# Patient Record
Sex: FEMALE | Race: BLACK OR AFRICAN AMERICAN | URBAN - METROPOLITAN AREA
[De-identification: names, ages, dates, MRNs, and addresses within clinical notes are randomized per-mention and may not be internally consistent; named-entity substitution may affect disease eponyms.]

---

## 2017-01-16 ENCOUNTER — OFFICE VISIT - RIVER FALLS (OUTPATIENT)
Dept: FAMILY MEDICINE | Facility: CLINIC | Age: 27
End: 2017-01-16

## 2017-06-16 ENCOUNTER — OFFICE VISIT - RIVER FALLS (OUTPATIENT)
Dept: FAMILY MEDICINE | Facility: CLINIC | Age: 27
End: 2017-06-16

## 2017-06-16 ASSESSMENT — MIFFLIN-ST. JEOR: SCORE: 1631.68

## 2020-06-19 DIAGNOSIS — Z11.59 SPECIAL SCREENING EXAMINATION FOR VIRAL DISEASE: Primary | ICD-10-CM

## 2022-02-12 VITALS
RESPIRATION RATE: 16 BRPM | HEIGHT: 67 IN | BODY MASS INDEX: 30.76 KG/M2 | DIASTOLIC BLOOD PRESSURE: 60 MMHG | SYSTOLIC BLOOD PRESSURE: 96 MMHG | OXYGEN SATURATION: 97 % | WEIGHT: 196 LBS | HEART RATE: 80 BPM | TEMPERATURE: 102.9 F

## 2022-02-12 VITALS — HEART RATE: 60 BPM | SYSTOLIC BLOOD PRESSURE: 108 MMHG | DIASTOLIC BLOOD PRESSURE: 68 MMHG | TEMPERATURE: 98.2 F

## 2022-02-16 NOTE — NURSING NOTE
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 She reports having chest discomfort mid -low sternum area for several days.She recently had a ECG that was normal. See chart note of recent visit.    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  She is also is drinking >60 oz of diet coke per day and eating a very unhealthy diet. I suggested replacing the coke with water and trying a OTC acid reducer such as Tums or Maalox.  If symptoms don't improve or resolve she should be evaluated ASAP. ED if pain becomes or discomfort increases.  Patient voiced understanding.    She will be making appointment for a well women visit with LILIANA soon to discuss other health issues.

## 2022-02-16 NOTE — PROGRESS NOTES
Patient:   RO PERSON            MRN: [id number]            FIN: [id number]               Age:   27 years     Sex:  Female     :  1990   Associated Diagnoses:   None   Author:   Garry Dunn MD      Visit Information      Date of Service: 2017 05:04 pm  Performing Location: [facility]  Encounter#: [id number]      Primary Care Provider (PCP):  [id number] -UNKNOWN,      Referring Provider:  No referring provider recorded for selected visit.      Chief Complaint   2017 5:19 PM CST    Numbness in hands and toes.  Left side of face gets puffy.  ONgoing for a couple days.        History of Present Illness   Pt has had 3 days of intermittent, lasting a few hours of feeling of tingling and warmth spreading up her nek into the left side of her face, also going down her left arm, feels like she has to shake her arm out, and in her elft tose. Also with some chest discomfort. Feels like her neck is tight in the back.  Course seems to be improving.    She is not on estrogen containing birth control. No significant / pertinent family hsitory    She has been taking hydroxycut for the last week for wt loss.      Review of Systems         Gen - no fever  Neuro - no weaknss, dysarthria, balance problems.  Psych - has anxiety      Health Status   Allergies:    Allergic Reactions (Selected)  No known allergies   Medications:  (Selected)   Documented Medications  Documented  Nexplanon: ( 68 mg ), subcutaneous, once, 0 Refill(s), Type: Maintenance      Histories   Family History:    HTN - Hypertension  Mother (Rochelle Person)     Social History:        Alcohol Assessment            Never      Tobacco Assessment            Never      Substance Abuse Assessment            Never      Employment and Education Assessment            Employed, Work/School description: .      Home and Environment Assessment            Marital status: Single.      Nutrition and Health Assessment             Type of diet: Regular.      Exercise and Physical Activity Assessment            Exercise frequency: 3-4 times/week.  Exercise type: Bicycling, Running.      Sexual Assessment            Sexually active: Yes.  Sexual orientation: Heterosexual.  Contraceptive Use Details: Nexplanon.        Physical Examination   Vital Signs   1/16/2017 5:19 PM CST Temperature Tympanic 98.2 DegF    Peripheral Pulse Rate 60 bpm    Systolic Blood Pressure 108 mmHg    Diastolic Blood Pressure 68 mmHg    Mean Arterial Pressure 81 mmHg      Measurements from flowsheet : Measurements   1/16/2017 5:19 PM CST    Ht/Wt Measurement Refused by Patient?     Yes     Gen - appears well  CV: RRR w/o MRG. Normal S1 and S2   Resp: Clear to auscultation b/l. Normal breath sounds without wheezes or crackles. No increased work of breathing.   Neuro - Alert & oriented. CN intact. Balance normal. Strength normal throughout. Reflexes normal.   Neck - Moving her neck seems to illicit he symptoms somewhat         Impression and Plan   Medication reaction - hydroxycut  - EKG reviewed, normal. Computer reads as left atrial enlargement but I don't see that the criteria for this are met. I also don't see any evidence of ST elevation in contiguous leads.  - recommended some basic bloodwork especially has hydroxycut can cause liver toxicity but pt declined stating that the cost is somewhat prohibitive for her  - will continue to observe for now, do not resume taking hydroxycut - we discussed reasons for going to ED. Suspect anxiety/panic is playing a role as well.   - will touch base by phone with her henrietta this week.

## 2022-02-16 NOTE — PROGRESS NOTES
"   Patient:   RO PERSON            MRN: [id number]            FIN: [id number]               Age:   27 years     Sex:  Female     :  1990   Associated Diagnoses:   Acute neck pain; Fever; Pharyngitis; Strep throat   Author:   Milad Reid PA-C      Chief Complaint   2017 2:44 PM CDT    Pt here for \"burning\" pain in hands and feet, joint pain,Headache,stiff neck, dizziness and sore throat x 3 days      History of Present Illness   Chief complaint and symptoms noted above and confirmed with patient   as above,  sxs for 3 days  has taken some ibuprofen but none today  she is concerned about Lyme disease,  is outside frequently but no known tick exposure      Review of Systems   Constitutional:  Fever.    Ear/Nose/Mouth/Throat:  Sore throat.    Respiratory:  Cough.    Gastrointestinal:  Nausea, No vomiting, No diarrhea.    Neurologic:  Dizziness, Headache.       Health Status   Allergies:    Allergic Reactions (Selected)  No known allergies   Medications:  (Selected)   Documented Medications  Documented  Nexplanon: ( 68 mg ), subcutaneous, once, 0 Refill(s), Type: Maintenance   Problem list:    All Problems  Obesity / SNOMED CT 8186889735 / Probable  Heart Murmur / ICD-9-.2 / Confirmed      Histories   Past Medical History:    Active  Heart Murmur (785.2)   Family History:    HTN - Hypertension  Mother (Rochelle Person)     Procedure history:    No previous procedures.   Social History:        Alcohol Assessment            Never      Tobacco Assessment            Never      Substance Abuse Assessment            Never      Employment and Education Assessment            Employed, Work/School description: .      Home and Environment Assessment            Marital status: Single.      Nutrition and Health Assessment            Type of diet: Regular.      Exercise and Physical Activity Assessment            Exercise frequency: 3-4 times/week.  Exercise type: Bicycling, Running.      " Sexual Assessment            Sexually active: Yes.  Sexual orientation: Heterosexual.  Contraceptive Use Details: Nexplanon.        Physical Examination   Vital Signs   6/16/2017 2:44 PM CDT Temperature Tympanic 102.9 DegF  HI    Peripheral Pulse Rate 80 bpm    Pulse Site Radial artery    Respiratory Rate 16 br/min    Systolic Blood Pressure 96 mmHg    Diastolic Blood Pressure 60 mmHg    Mean Arterial Pressure 72 mmHg    BP Site Right arm    Oxygen Saturation 97 %      Measurements from flowsheet : Measurements   6/16/2017 2:44 PM CDT Height Measured - Standard 67 in    Weight Measured - Standard 196 lb    BSA 2.05 m2    Body Mass Index 30.69 kg/m2      General:  Moderate distress.    HENT:  Tympanic membranes are clear, oropharynx is moderately enlarged and inflamed without exudate, maxillary sinus tenderness.    Neck:  neck is very sore and tender, ROM is painful and slow but reasonably full, moderate lymphadenopathy.    Respiratory:  Lungs are clear to auscultation.    Cardiovascular:  Normal rate, Regular rhythm, No murmur.    Gastrointestinal:  Soft, Non-tender, Non-distended, Normal bowel sounds, No organomegaly.       Review / Management   Results review:       Interpretation: rapid strep is positive.       Impression and Plan   Diagnosis     Acute neck pain (WOY16-RY M54.2).     Fever (CWT05-RF R50.9).     Pharyngitis (QBG14-BP J02.9).     Strep throat (XQG30-EZ J02.0).     Summary:  patient declines further lab testing other than strep, will treat as strep with amoxicillin,  use tylenol/ibuprofen, throat lozenges, salt water gargles, follow up if not improving.    Orders     Orders   Lab (Gen Lab  Reference Lab):  Lyme disease antibody, total, eia with reflex to western blot (igg,igm)* ([facility]) (Order): Specimen Type: Serum, Collection Date: 6/16/2017 3:12 PM CDT  Requests (Lab):  CBC w/ Diff/Plt (Request) (Order): Priority: Urgent, Fever  Acute neck pain  Basic Metabolic Panel (Request) (Order): Priority:  Urgent, Fever  Acute neck pain.     Orders   Pharmacy:  amoxicillin 500 mg oral capsule (Prescribe): 1 cap(s) ( 500 mg ), PO, TID, x 10 day(s), # 30 cap(s), 0 Refill(s), Type: Maintenance, Pharmacy: [facility], 1 cap(s) po tid,x10 day(s).     Orders   Charges (Evaluation and Management):  [id number] office outpatient visit 15 minutes (Charge) (Order): Quantity: 1, Strep throat  Acute neck pain  Fever.

## 2022-02-16 NOTE — PROGRESS NOTES
Patient:   RO PERSON            MRN: [id number]            FIN: [id number]               Age:   27 years     Sex:  Female     :  1990   Associated Diagnoses:   None   Author:   Mona BURNETTE, Garry      Procedure   EKG      Impression and Plan   Sinus bradycardia. Rate 50, normal intervals, axis; P, QRS, ST segments normal.